# Patient Record
Sex: FEMALE | Race: WHITE
[De-identification: names, ages, dates, MRNs, and addresses within clinical notes are randomized per-mention and may not be internally consistent; named-entity substitution may affect disease eponyms.]

---

## 2018-07-21 ENCOUNTER — HOSPITAL ENCOUNTER (EMERGENCY)
Dept: HOSPITAL 25 - UCEAST | Age: 53
Discharge: HOME | End: 2018-07-21
Payer: COMMERCIAL
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
## 2018-07-21 DIAGNOSIS — S81.012A: Primary | ICD-10-CM

## 2018-07-21 PROCEDURE — 99202 OFFICE O/P NEW SF 15 MIN: CPT

## 2018-07-21 PROCEDURE — G0463 HOSPITAL OUTPT CLINIC VISIT: HCPCS

## 2018-07-21 PROCEDURE — 12002 RPR S/N/AX/GEN/TRNK2.6-7.5CM: CPT

## 2018-07-21 PROCEDURE — 90471 IMMUNIZATION ADMIN: CPT

## 2018-07-21 PROCEDURE — 90715 TDAP VACCINE 7 YRS/> IM: CPT

## 2018-07-21 NOTE — UC
Laceration HPI





- HPI Summary


HPI Summary: 


Moira BARDALES, scribed for Jack Bennett MD. 





Pt is a 53 y/o F who presents to [facility] c/o L knee laceration s/p fall. She was 

out for a run and while on railroad tracks she tripped and fell approximately 1 

hour PTA. Associated pain is mild, ranked 1/10 and characterized as an ache, 

aggravated and alleviated by nothing. Tetanus status unknown. 





- History Of Current Complaint


Chief Complaint: UCLaceration


Stated Complaint: KNEE LAC


Time Seen by Provider: 07/21/18 13:01


Hx Obtained From: Patient


Laceration Location: Knee - Left


Mechanism Of Injury: Sharp Trauma - Railroad tracks


Severity: Mild


Pain Intensity: 1


Pain Scale Used: 0-10 Numeric


Aggravating Factors: Nothing





- Allergies/Home Medications


Allergies/Adverse Reactions: 


 Allergies











Allergy/AdvReac Type Severity Reaction Status Date / Time


 


No Known Allergies Allergy   Verified 07/21/18 12:43











Home Medications: 


 Home Medications





Citalopram TAB* [Celexa TAB*] 20 mg PO DAILY 07/21/18 [History Confirmed 07/21/ 18]











PMH/Surg Hx/FS Hx/Imm Hx





- Additional Past Medical History


Additional PMH: 


NEGATIVE PMHx: DM, HTN, CAD





- Surgical History


Surgical History: Yes


Surgery Procedure, Year, and Place: tubal foot surgery





- Family History


Known Family History: 


   Negative: Cardiac Disease, Hypertension, Diabetes





- Social History


Alcohol Use: Occasionally


Substance Use Type: None


Smoking Status (MU): Never Smoked Tobacco





Review of Systems


Constitutional: Negative


Skin: Other - L knee laceration


Eyes: Negative


ENT: Negative


Respiratory: Negative


Cardiovascular: Negative


Gastrointestinal: Negative


Genitourinary: Negative


Motor: Negative


Neurovascular: Negative


Musculoskeletal: Negative


Neurological: Negative


Psychological: Negative


All Other Systems Reviewed And Are Negative: Yes





Physical Exam





- Summary


Physical Exam Summary: 


Appearance: Well-appearing, Well-nourished


Skin: Jagged, 3 cm upside down U-shaped laceration, deep, small amount of 

subcutaneous tissues exposed,  over the left lateral patella with inferior 

superficial abrasions


Eyes: Normal


ENT: Normal


Neck: Supple, nontender


Respiratory: Clear to auscultation


Cardiovascular: Regular rate, regular rhythm. Normal S1, S2.


Musculoskeletal: Normal, Strength/ROM Intact


Neurological: Normal, A&Ox3


Psychiatric: Normal


General: No acute distress





Triage Information Reviewed: Yes


Vital Signs: 


 Initial Vital Signs











Temp  98.7 F   07/21/18 12:39


 


Pulse  83   07/21/18 12:39


 


Resp  15   07/21/18 12:39


 


BP  123/73   07/21/18 12:39


 


Pulse Ox  98   07/21/18 12:39











Vital Signs Reviewed: Yes





Laceration Repair





- Laceration Repair


  ** 1


Description: Irregular - Jagged, upside down U-Shaped


Laceration Size After Repair: Length (cm) - 3 cm


Contamination/FB Removal: Irrigated


Anesthesia Used: 0.25% Marcaine


Closure Material: Sutures


Closure Method: Multilayer


Suture Of: Skin - 8 4-0 nylon sutures, Mucus Membrane - 6 3-0 vicryl sutures


Suture Type: Nylon, Vicryl





Re-Evaluation





- Re-Evaluation


  ** First Eval


Re-Evaluation Time: 13:50


Comment: Laceration repair - see procedure note.





Laceration Course/Dx





- Course/Dx


Course Of Treatment: wound irrigated with saline and peroxide, 2 layer lac 

repair done with Six 3-O vicryl sutures in subcut layer and Eight 3-0 nylon 

sutures placed in superficial skin layer under local anesthesia. Pt tolerated 

procedure well, Tdap booster administered as last one was in 2012 and this is a 

dirty wound





- Differential Dx - Laceration/Wound


Provider Diagnoses: Complicated Left knee laceration





Discharge





- Sign-Out/Discharge


Documenting (check all that apply): Patient Departure - Discharge





- Discharge Plan


Condition: Stable


Disposition: HOME


Prescriptions: 


Cephalexin CAP* [Keflex CAP*] 500 mg PO QID 10 Days #40 cap


Patient Education Materials:  Care For Your Stitches (ED), Laceration (ED)


Referrals: 


No Primary Care Phys,NOPCP [Primary Care Provider] - 


Additional Instructions: 


Follow up for wound check with PCP in [place] or Urgent Care for wound recheck in 2 

days. 





- Billing Disposition and Condition


Condition: STABLE


Disposition: Home